# Patient Record
Sex: MALE | Race: WHITE
[De-identification: names, ages, dates, MRNs, and addresses within clinical notes are randomized per-mention and may not be internally consistent; named-entity substitution may affect disease eponyms.]

---

## 2017-05-03 ENCOUNTER — HOSPITAL ENCOUNTER (OUTPATIENT)
Dept: HOSPITAL 53 - M OPP | Age: 72
End: 2017-05-03
Attending: SURGERY
Payer: MEDICARE

## 2017-05-03 VITALS — DIASTOLIC BLOOD PRESSURE: 66 MMHG | SYSTOLIC BLOOD PRESSURE: 133 MMHG

## 2017-05-03 VITALS — BODY MASS INDEX: 43.18 KG/M2 | WEIGHT: 305 LBS | HEIGHT: 70.5 IN

## 2017-05-03 DIAGNOSIS — K64.2: ICD-10-CM

## 2017-05-03 DIAGNOSIS — M19.90: ICD-10-CM

## 2017-05-03 DIAGNOSIS — E78.5: ICD-10-CM

## 2017-05-03 DIAGNOSIS — Z87.891: ICD-10-CM

## 2017-05-03 DIAGNOSIS — F41.9: ICD-10-CM

## 2017-05-03 DIAGNOSIS — E55.9: ICD-10-CM

## 2017-05-03 DIAGNOSIS — R19.5: Primary | ICD-10-CM

## 2017-05-03 DIAGNOSIS — M54.2: ICD-10-CM

## 2017-05-03 DIAGNOSIS — Z79.84: ICD-10-CM

## 2017-05-03 DIAGNOSIS — Z79.899: ICD-10-CM

## 2017-05-03 DIAGNOSIS — G47.30: ICD-10-CM

## 2017-05-03 DIAGNOSIS — I10: ICD-10-CM

## 2017-05-03 DIAGNOSIS — M54.5: ICD-10-CM

## 2017-05-03 NOTE — ROOR
________________________________________________________________________________

Patient Name: Emil Cox           Procedure Date: 5/3/2017 10:32 AM

MRN: [id number]                          Account Number: [id number]

YOB: 1945               Age: 72

Room: [facility]                            Gender: Male

Note Status: Finalized                 

________________________________________________________________________________

 

Procedure:           Colonoscopy

Indications:         Heme positive stool

Providers:           DO Tanya Allan MD:        DENVER TERRY

Requesting Provider: 

Medicines:           Propofol per Anesthesia

Complications:       No immediate complications.

________________________________________________________________________________

Procedure:           Pre-Anesthesia Assessment:

                     - Prior to the procedure, a History and Physical was 

                     performed, and patient medications and allergies were 

                     reviewed. The patient is competent. The risks and 

                     benefits of the procedure and the sedation options and 

                     risks were discussed with the patient. All questions were 

                     answered and informed consent was obtained. Patient 

                     identification and proposed procedure were verified by 

                     the physician, the nurse, the anesthesiologist and the 

                     technician in the endoscopy suite. Mental Status 

                     Examination: alert and oriented. Airway Examination: 

                     normal oropharyngeal airway and neck mobility. 

                     Respiratory Examination: clear to auscultation. CV 

                     Examination: normal. Prophylactic Antibiotics: The 

                     patient does not require prophylactic antibiotics. Prior 

                     Anticoagulants: The patient has taken no previous 

                     anticoagulant or antiplatelet agents. ASA Grade 

                     Assessment: II - A patient with mild systemic disease. 

                     After reviewing the risks and benefits, the patient was 

                     deemed in satisfactory condition to undergo the 

                     procedure. The anesthesia plan was to use monitored 

                     anesthesia care (MAC). Immediately prior to 

                     administration of medications, the patient was 

                     re-assessed for adequacy to receive sedatives. The heart 

                     rate, respiratory rate, oxygen saturations, blood 

                     pressure, adequacy of pulmonary ventilation, and response 

                     to care were monitored throughout the procedure. The 

                     physical status of the patient was re-assessed after the 

                     procedure.

                     The Colonoscope was introduced through the anus and 

                     advanced to the ascending colon. The colonoscopy was 

                     performed without difficulty. The patient tolerated the 

                     procedure well.

                                                                                

Findings:

     The perianal exam findings include non-thrombosed internal hemorrhoids 

     and internal hemorrhoids that prolapse with straining, but require manual 

     replacement into the anal canal (Grade III).

     The exam was otherwise without abnormality on direct and retroflexion 

     views.

                                                                                

Impression:          - Non-thrombosed internal hemorrhoids and internal 

                     hemorrhoids that prolapse with straining, but require 

                     manual replacement into the anal canal (Grade III) found 

                     on perianal exam.

                     - The examination was otherwise normal on direct and 

                     retroflexion views.

                     - No specimens collected.

Recommendation:      - Patient has a contact number available for emergencies. 

                     The signs and symptoms of potential delayed complications 

                     were discussed with the patient. Return to normal 

                     activities tomorrow. Written discharge instructions were 

                     provided to the patient.

                     - Repeat colonoscopy in 5-10 years for screening purposes.

                     - Return to my office in 2 weeks.

                     - Telephone my office to schedule appointment.

                                                                                

 

_________________

Ellis Crenshaw DO

5/3/2017 11:01:01 AM

This report has been signed electronically.

Number of Addenda: 0

 

Note Initiated On: 5/3/2017 10:32 AM

Estimated Blood Loss:

     Estimated blood loss was minimal.

## 2023-07-26 ENCOUNTER — HOSPITAL ENCOUNTER (OUTPATIENT)
Dept: HOSPITAL 53 - M SDC | Age: 78
Discharge: HOME | End: 2023-07-26
Attending: OPHTHALMOLOGY
Payer: MEDICARE

## 2023-07-26 VITALS — WEIGHT: 315 LBS | HEIGHT: 70 IN | BODY MASS INDEX: 45.1 KG/M2

## 2023-07-26 VITALS — OXYGEN SATURATION: 96 % | SYSTOLIC BLOOD PRESSURE: 151 MMHG | DIASTOLIC BLOOD PRESSURE: 71 MMHG | TEMPERATURE: 97.5 F

## 2023-07-26 DIAGNOSIS — Z79.899: ICD-10-CM

## 2023-07-26 DIAGNOSIS — M54.9: ICD-10-CM

## 2023-07-26 DIAGNOSIS — Z79.84: ICD-10-CM

## 2023-07-26 DIAGNOSIS — R06.83: ICD-10-CM

## 2023-07-26 DIAGNOSIS — I10: ICD-10-CM

## 2023-07-26 DIAGNOSIS — G47.30: ICD-10-CM

## 2023-07-26 DIAGNOSIS — M19.90: ICD-10-CM

## 2023-07-26 DIAGNOSIS — H25.11: Primary | ICD-10-CM

## 2023-07-26 DIAGNOSIS — F17.220: ICD-10-CM

## 2023-07-26 DIAGNOSIS — R06.02: ICD-10-CM

## 2023-07-26 PROCEDURE — 66982 XCAPSL CTRC RMVL CPLX WO ECP: CPT

## 2023-07-26 RX ADMIN — CYCLOPENTOLATE HYDROCHLORIDE SCH DROP: 10 SOLUTION/ DROPS OPHTHALMIC at 13:18

## 2023-07-26 RX ADMIN — TROPICAMIDE SCH DROP: 10 SOLUTION/ DROPS OPHTHALMIC at 13:18

## 2023-07-26 RX ADMIN — PHENYLEPHRINE HYDROCHLORIDE SCH DROP: 25 SOLUTION/ DROPS OPHTHALMIC at 13:23

## 2023-07-26 RX ADMIN — TROPICAMIDE SCH DROP: 10 SOLUTION/ DROPS OPHTHALMIC at 13:23

## 2023-07-26 RX ADMIN — CYCLOPENTOLATE HYDROCHLORIDE SCH DROP: 10 SOLUTION/ DROPS OPHTHALMIC at 13:23

## 2023-07-26 RX ADMIN — PHENYLEPHRINE HYDROCHLORIDE SCH DROP: 25 SOLUTION/ DROPS OPHTHALMIC at 13:18
